# Patient Record
Sex: MALE | URBAN - NONMETROPOLITAN AREA
[De-identification: names, ages, dates, MRNs, and addresses within clinical notes are randomized per-mention and may not be internally consistent; named-entity substitution may affect disease eponyms.]

---

## 2023-02-17 ENCOUNTER — APPOINTMENT (RX ONLY)
Dept: URBAN - NONMETROPOLITAN AREA CLINIC 13 | Facility: CLINIC | Age: 53
Setting detail: DERMATOLOGY
End: 2023-02-17

## 2023-02-17 DIAGNOSIS — Z41.9 ENCOUNTER FOR PROCEDURE FOR PURPOSES OTHER THAN REMEDYING HEALTH STATE, UNSPECIFIED: ICD-10-CM

## 2023-02-17 PROCEDURE — ? COSMETIC CONSULTATION - PULSED-DYE LASER

## 2023-02-17 PROCEDURE — ? PULSED-DYE LASER

## 2023-02-17 ASSESSMENT — LOCATION SIMPLE DESCRIPTION DERM: LOCATION SIMPLE: LEFT LIP

## 2023-02-17 ASSESSMENT — LOCATION ZONE DERM: LOCATION ZONE: LIP

## 2023-02-17 ASSESSMENT — LOCATION DETAILED DESCRIPTION DERM: LOCATION DETAILED: LEFT LOWER CUTANEOUS LIP

## 2023-02-17 NOTE — PROCEDURE: PULSED-DYE LASER
Consent: Written consent obtained, risks reviewed including but not limited to crusting, scabbing, blistering, scarring, darker or lighter pigmentary change, incidental hair removal, bruising, and/or incomplete removal.
Cryogen Time (Ms): 30
Treated Area: small area
Pulse Duration: 10 ms
Laser Type: Vbeam 595nm
Spot Size: 7 mm
Post-Care Instructions: I reviewed with the patient in detail post-care instructions. Patient should stay away from the sun and wear sun protection until treated areas are fully healed.
Immediate Endpoint: purpura
Location Override: Left Chin
Detail Level: Zone
Delay Time (Ms): 20
Post-Procedure Care: Sunscreen applied. Post care reviewed with patient.
Spot Size: 10x3 mm
Price (Use Numbers Only, No Special Characters Or $): 105
Fluence In J/Cm2 (Optional): 10
Fluence In J/Cm2 (Optional): 12

## 2023-03-16 ENCOUNTER — APPOINTMENT (RX ONLY)
Dept: URBAN - NONMETROPOLITAN AREA CLINIC 13 | Facility: CLINIC | Age: 53
Setting detail: DERMATOLOGY
End: 2023-03-16

## 2023-03-16 DIAGNOSIS — Z41.9 ENCOUNTER FOR PROCEDURE FOR PURPOSES OTHER THAN REMEDYING HEALTH STATE, UNSPECIFIED: ICD-10-CM

## 2023-03-16 PROCEDURE — ? PULSED-DYE LASER

## 2023-03-16 NOTE — PROCEDURE: PULSED-DYE LASER
Treated Area: small area
Delay Time (Ms): 30
Spot Size: 10x3 mm
Consent: Electronic consent obtained, risks reviewed including but not limited to crusting, scabbing, blistering, scarring, darker or lighter pigmentary change, incidental hair removal, bruising, and/or incomplete removal.
Pulse Duration: 10 ms
Post-Procedure Care: Sunscreen applied. Ice pack provided. Post care reviewed with patient.
Spot Size: 7 mm
Location Override: Left chin
Immediate Endpoint: erythema
Delay Time (Ms): 20
Detail Level: Zone
Fluence In J/Cm2 (Optional): 10
Immediate Endpoint: purpura
Post-Care Instructions: I reviewed with the patient in detail post-care instructions. Patient should stay away from the sun and wear sun protection until treated areas are fully healed.
Price (Use Numbers Only, No Special Characters Or $): 105
Laser Type: Vbeam 595nm
Pulse Count: 9
Fluence In J/Cm2 (Optional): 12

## 2023-04-13 ENCOUNTER — APPOINTMENT (RX ONLY)
Dept: URBAN - NONMETROPOLITAN AREA CLINIC 13 | Facility: CLINIC | Age: 53
Setting detail: DERMATOLOGY
End: 2023-04-13

## 2023-04-13 DIAGNOSIS — Z41.9 ENCOUNTER FOR PROCEDURE FOR PURPOSES OTHER THAN REMEDYING HEALTH STATE, UNSPECIFIED: ICD-10-CM

## 2023-04-13 PROCEDURE — ? PULSED-DYE LASER

## 2023-04-13 NOTE — PROCEDURE: PULSED-DYE LASER
Post-Care Instructions: I reviewed with the patient in detail post-care instructions. Patient should stay away from the sun and wear sun protection until treated areas are fully healed.
Delay Time (Ms): 20
Treated Area: small area
Spot Size: 7 mm
Detail Level: Zone
Treated Area: medium area
Cryogen Time (Ms): 30
Pulse Duration: 10 ms
Fluence In J/Cm2 (Optional): 10
Post-Procedure Care: Sunscreen applied. Ice pack provided. Post care reviewed with patient.
Immediate Endpoint: erythema
Fluence In J/Cm2 (Optional): 8.5
Price (Use Numbers Only, No Special Characters Or $): 105
Consent: Electronic consent obtained, risks reviewed including but not limited to crusting, scabbing, blistering, scarring, darker or lighter pigmentary change, incidental hair removal, bruising, and/or incomplete removal.
Laser Type: Vbeam 595nm
Location Override: Chin
Spot Size: 10 mm